# Patient Record
Sex: MALE | Race: WHITE | NOT HISPANIC OR LATINO | URBAN - METROPOLITAN AREA
[De-identification: names, ages, dates, MRNs, and addresses within clinical notes are randomized per-mention and may not be internally consistent; named-entity substitution may affect disease eponyms.]

---

## 2023-10-13 DIAGNOSIS — F43.23 ADJUSTMENT DISORDER WITH MIXED ANXIETY AND DEPRESSED MOOD: Primary | ICD-10-CM

## 2023-10-16 RX ORDER — PAROXETINE HYDROCHLORIDE 40 MG/1
40 TABLET, FILM COATED ORAL DAILY
Qty: 30 TABLET | Refills: 0 | Status: SHIPPED | OUTPATIENT
Start: 2023-10-16 | End: 2023-12-06 | Stop reason: SDUPTHER

## 2023-10-20 PROBLEM — R69 DISEASE: Status: ACTIVE | Noted: 2023-10-20

## 2023-10-20 RX ORDER — TAMSULOSIN HYDROCHLORIDE 0.4 MG/1
0.4 CAPSULE ORAL NIGHTLY
COMMUNITY
Start: 2021-05-05 | End: 2024-05-22 | Stop reason: SDUPTHER

## 2023-10-20 RX ORDER — ZOLPIDEM TARTRATE 10 MG/1
TABLET ORAL
COMMUNITY
Start: 2021-09-24 | End: 2023-10-25 | Stop reason: SDUPTHER

## 2023-10-20 RX ORDER — ASPIRIN 81 MG/1
TABLET ORAL
COMMUNITY
Start: 2021-09-20

## 2023-10-20 RX ORDER — TIZANIDINE 2 MG/1
TABLET ORAL
COMMUNITY
Start: 2022-01-10

## 2023-10-23 ENCOUNTER — APPOINTMENT (OUTPATIENT)
Dept: PRIMARY CARE | Facility: CLINIC | Age: 48
End: 2023-10-23
Payer: MEDICAID

## 2023-10-25 ENCOUNTER — OFFICE VISIT (OUTPATIENT)
Dept: PRIMARY CARE | Facility: CLINIC | Age: 48
End: 2023-10-25
Payer: MEDICAID

## 2023-10-25 VITALS
TEMPERATURE: 97.6 F | RESPIRATION RATE: 16 BRPM | WEIGHT: 162 LBS | OXYGEN SATURATION: 97 % | SYSTOLIC BLOOD PRESSURE: 118 MMHG | HEART RATE: 80 BPM | HEIGHT: 71 IN | DIASTOLIC BLOOD PRESSURE: 72 MMHG | BODY MASS INDEX: 22.68 KG/M2

## 2023-10-25 DIAGNOSIS — G47.00 INSOMNIA, UNSPECIFIED TYPE: Primary | ICD-10-CM

## 2023-10-25 DIAGNOSIS — G89.29 CHRONIC LOW BACK PAIN WITHOUT SCIATICA, UNSPECIFIED BACK PAIN LATERALITY: Primary | ICD-10-CM

## 2023-10-25 DIAGNOSIS — Z00.00 WELLNESS EXAMINATION: ICD-10-CM

## 2023-10-25 DIAGNOSIS — M54.50 CHRONIC LOW BACK PAIN WITHOUT SCIATICA, UNSPECIFIED BACK PAIN LATERALITY: Primary | ICD-10-CM

## 2023-10-25 DIAGNOSIS — N40.0 BENIGN PROSTATIC HYPERPLASIA WITHOUT LOWER URINARY TRACT SYMPTOMS: ICD-10-CM

## 2023-10-25 PROCEDURE — 99214 OFFICE O/P EST MOD 30 MIN: CPT | Performed by: INTERNAL MEDICINE

## 2023-10-25 RX ORDER — ZOLPIDEM TARTRATE 10 MG/1
10 TABLET ORAL NIGHTLY PRN
Qty: 90 TABLET | Refills: 1 | Status: SHIPPED | OUTPATIENT
Start: 2023-10-25 | End: 2024-05-22 | Stop reason: SINTOL

## 2023-10-25 RX ORDER — ZOLPIDEM TARTRATE 10 MG/1
TABLET ORAL
Qty: 90 TABLET | Refills: 1 | Status: SHIPPED | OUTPATIENT
Start: 2023-10-25 | End: 2023-10-25 | Stop reason: SDUPTHER

## 2023-10-25 RX ORDER — ZOLPIDEM TARTRATE 10 MG/1
10 TABLET ORAL NIGHTLY PRN
Qty: 90 TABLET | Refills: 1 | Status: SHIPPED | OUTPATIENT
Start: 2023-10-25 | End: 2023-10-25 | Stop reason: SDUPTHER

## 2023-10-25 ASSESSMENT — ENCOUNTER SYMPTOMS
HEADACHES: 1
FEVER: 0
DIZZINESS: 0
CONSTIPATION: 0
ABDOMINAL PAIN: 0
BACK PAIN: 1
PALPITATIONS: 0
ADENOPATHY: 0
ACTIVITY CHANGE: 0
JOINT SWELLING: 0
NERVOUS/ANXIOUS: 1
FREQUENCY: 1
HIP PAIN: 1
BLOOD IN STOOL: 0
DYSURIA: 0
ARTHRALGIAS: 1
NUMBNESS: 0
COUGH: 0
SHORTNESS OF BREATH: 0

## 2023-10-25 NOTE — PROGRESS NOTES
Patient was seen and examined by me. History and exam as documented above by PA/NP was confirmed by me.  Agree with plan as outlined above. Subjective   Patient ID: Darwin Beck is a 48 y.o. male who presents for No chief complaint on file..     He is doing ok , feels fine. He denies recent migraine headaches, happens rarely now , and takes tylenol , and has seen Dr Ulices Fish and he takes Nurtec ODT prn  for headaches which works like wonder.  He has noticed low back pain over last 6 months which is slowly getting worse, he does not have weakness, tingling or numbness, or limping gait.  No foot drop , B/B incontinence. He denies injury or fall, lifting heavy weight etc.  He had imaging studies , MRI done. His Cervical spine xray showed moderate multilevel DJD. Ambien helps for sleep. 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     Hip Pain   Pertinent negatives include no numbness.        Review of Systems   Constitutional:  Negative for activity change and fever.   HENT:  Negative for congestion.    Eyes:  Negative for visual disturbance.   Respiratory:  Negative for cough and shortness of breath.    Cardiovascular:  Negative for chest pain, palpitations and leg swelling.   Gastrointestinal:  Negative for abdominal pain, blood in stool and constipation.   Genitourinary:  Positive for frequency. Negative for dysuria, enuresis and urgency.   Musculoskeletal:  Positive for arthralgias and back pain. Negative for joint swelling.   Skin:  Negative for rash.   Neurological:  Positive for headaches. Negative for dizziness and numbness.        Migraine headaches, Nurtec ODT helps   Hematological:  Negative for adenopathy.   Psychiatric/Behavioral:  The patient is nervous/anxious.        Objective   There were no vitals taken for this visit.    Physical Exam  Constitutional:       Appearance: Normal appearance.   HENT:      Nose: No congestion.      Mouth/Throat:      Mouth: Mucous membranes are moist.      Pharynx: Oropharynx is clear.   Eyes:      Conjunctiva/sclera: Conjunctivae normal.   Cardiovascular:      Rate and Rhythm: Normal rate and  regular rhythm.      Pulses: Normal pulses.      Heart sounds: Normal heart sounds. No murmur heard.  Pulmonary:      Effort: Pulmonary effort is normal.      Breath sounds: Normal breath sounds.   Abdominal:      General: Abdomen is flat. Bowel sounds are normal.      Palpations: Abdomen is soft.   Musculoskeletal:         General: Tenderness present. Normal range of motion.      Cervical back: Normal range of motion and neck supple.      Right lower leg: No edema.      Left lower leg: No edema.      Comments: Lower lumbar spine area soreness  ROM and gait is normal.   Skin:     General: Skin is warm and dry.   Neurological:      General: No focal deficit present.      Mental Status: He is alert and oriented to person, place, and time.   Psychiatric:         Behavior: Behavior normal.         Assessment/Plan     Cervical spine DJD :   s/p nerve block , no current issues  otc analgesics , heat prn  back stretching exercises      Low back pain / DJD likely:  XR of LS spine  Otc analgesics, heat prn    Migraine headaches:  Nurtec ODT  prn   He tried Qlipta for prevention, but this changed his mood  consider Inderal ?   Neurology followup  , Dr Ulices Fish      Insomnia:  refill Zolpidem   OARRS reviewed      Mixed anxiety and depression:   Paxil seems to help well  Psych followup  , counselling     BPH:  Tamsulosin at hs  may followup with Urology  he had  normal PSA     Vision care  He had flushot, covid booster at pharmacy  Check labs   

## 2023-12-06 DIAGNOSIS — F43.23 ADJUSTMENT DISORDER WITH MIXED ANXIETY AND DEPRESSED MOOD: ICD-10-CM

## 2023-12-06 RX ORDER — PAROXETINE HYDROCHLORIDE 40 MG/1
40 TABLET, FILM COATED ORAL DAILY
Qty: 30 TABLET | Refills: 0 | Status: SHIPPED | OUTPATIENT
Start: 2023-12-06 | End: 2024-02-19

## 2024-04-16 DIAGNOSIS — N40.0 BENIGN PROSTATIC HYPERPLASIA WITHOUT LOWER URINARY TRACT SYMPTOMS: ICD-10-CM

## 2024-04-16 DIAGNOSIS — F43.23 ADJUSTMENT DISORDER WITH MIXED ANXIETY AND DEPRESSED MOOD: ICD-10-CM

## 2024-04-16 DIAGNOSIS — Z00.00 WELLNESS EXAMINATION: Primary | ICD-10-CM

## 2024-04-16 DIAGNOSIS — E78.00 PURE HYPERCHOLESTEROLEMIA: ICD-10-CM

## 2024-04-17 ENCOUNTER — APPOINTMENT (OUTPATIENT)
Dept: PRIMARY CARE | Facility: CLINIC | Age: 49
End: 2024-04-17
Payer: MEDICAID

## 2024-05-22 ENCOUNTER — OFFICE VISIT (OUTPATIENT)
Dept: PRIMARY CARE | Facility: CLINIC | Age: 49
End: 2024-05-22
Payer: MEDICAID

## 2024-05-22 VITALS
HEIGHT: 71 IN | TEMPERATURE: 97.2 F | DIASTOLIC BLOOD PRESSURE: 58 MMHG | SYSTOLIC BLOOD PRESSURE: 124 MMHG | WEIGHT: 165 LBS | BODY MASS INDEX: 23.1 KG/M2 | HEART RATE: 80 BPM | OXYGEN SATURATION: 97 %

## 2024-05-22 DIAGNOSIS — N40.0 BENIGN PROSTATIC HYPERPLASIA WITHOUT LOWER URINARY TRACT SYMPTOMS: Primary | ICD-10-CM

## 2024-05-22 DIAGNOSIS — F43.23 ADJUSTMENT DISORDER WITH MIXED ANXIETY AND DEPRESSED MOOD: ICD-10-CM

## 2024-05-22 PROCEDURE — 99214 OFFICE O/P EST MOD 30 MIN: CPT | Performed by: INTERNAL MEDICINE

## 2024-05-22 PROCEDURE — 99396 PREV VISIT EST AGE 40-64: CPT | Performed by: INTERNAL MEDICINE

## 2024-05-22 PROCEDURE — 1036F TOBACCO NON-USER: CPT | Performed by: INTERNAL MEDICINE

## 2024-05-22 RX ORDER — PAROXETINE HYDROCHLORIDE 40 MG/1
40 TABLET, FILM COATED ORAL DAILY
Qty: 90 TABLET | Refills: 1 | Status: SHIPPED | OUTPATIENT
Start: 2024-05-22

## 2024-05-22 RX ORDER — TAMSULOSIN HYDROCHLORIDE 0.4 MG/1
0.4 CAPSULE ORAL NIGHTLY
Qty: 90 CAPSULE | Refills: 1 | Status: SHIPPED | OUTPATIENT
Start: 2024-05-22

## 2024-05-22 ASSESSMENT — ENCOUNTER SYMPTOMS
NERVOUS/ANXIOUS: 1
FREQUENCY: 1
HIP PAIN: 1
CONSTIPATION: 0
SHORTNESS OF BREATH: 0
BLOOD IN STOOL: 0
ADENOPATHY: 0
PALPITATIONS: 0
DIZZINESS: 0
BACK PAIN: 1
ACTIVITY CHANGE: 0
DYSURIA: 0

## 2024-05-22 NOTE — PROGRESS NOTES
"Subjective   Patient ID: Darwin Beck is a 49 y.o. male who presents for Follow-up (6 months) and wellness exam and  Med Refill.     He is doing ok , feels fine. He denies recent migraine headaches, happens rarely now , and takes tylenol , and has seen Dr Ulices Fish and he takes Nurtec ODT prn  for headaches which works like wonder.  He has noticed low back pain over last 6 months which is slowly getting worse, he does not have weakness, tingling or numbness, or limping gait.  No foot drop , B/B incontinence. He denies injury or fall, lifting heavy weight etc.  He had imaging studies , MRI done. His Cervical spine xray showed moderate multilevel DJD. Ambien helps for sleep. 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          Review of Systems   Constitutional:  Negative for activity change.   Eyes:  Negative for visual disturbance.   Respiratory:  Negative for shortness of breath.    Cardiovascular:  Negative for palpitations and leg swelling.   Gastrointestinal:  Negative for blood in stool and constipation.   Genitourinary:  Positive for frequency. Negative for dysuria, enuresis and urgency.   Musculoskeletal:  Positive for back pain.   Neurological:  Negative for dizziness.        Migraine headaches, Nurtec ODT helps   Hematological:  Negative for adenopathy.   Psychiatric/Behavioral:  The patient is nervous/anxious.        Objective   /58 (BP Location: Left arm, Patient Position: Sitting, BP Cuff Size: Adult)   Pulse 80   Temp 36.2 °C (97.2 °F) (Temporal)   Ht 1.803 m (5' 11\")   Wt 74.8 kg (165 lb)   SpO2 97%   BMI 23.01 kg/m²     Physical Exam  Constitutional:       Appearance: Normal appearance.   HENT:      Nose: No congestion.      Mouth/Throat:      Mouth: Mucous membranes are moist.      Pharynx: Oropharynx is clear.   Eyes:      Conjunctiva/sclera: Conjunctivae normal.   Cardiovascular:      Rate and Rhythm: Normal rate and regular rhythm.      Pulses: Normal pulses.      Heart " sounds: Normal heart sounds. No murmur heard.  Pulmonary:      Effort: Pulmonary effort is normal.      Breath sounds: Normal breath sounds.   Abdominal:      General: Abdomen is flat. Bowel sounds are normal.      Palpations: Abdomen is soft.   Musculoskeletal:         General: Tenderness present. Normal range of motion.      Cervical back: Normal range of motion and neck supple.      Right lower leg: No edema.      Left lower leg: No edema.      Comments: Lower lumbar spine area soreness  ROM and gait is normal.   Skin:     General: Skin is warm and dry.   Neurological:      General: No focal deficit present.      Mental Status: He is alert and oriented to person, place, and time.   Psychiatric:         Behavior: Behavior normal.         Assessment/Plan     Cervical spine DJD :   s/p nerve block , no current issues  otc analgesics , heat prn  back stretching exercises      Low back pain / DJD likely:  XR of LS spine  Otc analgesics, heat prn    Migraine headaches:  Nurtec ODT  prn   He tried Qlipta for prevention, but this changed his mood  consider Inderal ?   Neurology followup  , Dr Ulices Fish      Insomnia:  He did not tolerate Zolpidem , was stopped  Melatonin prn    Mixed anxiety and depression:   Paxil seems to help well  Psych followup  , counselling - he does not want currently    BPH:  Tamsulosin at hs  may followup with Urology  he had  normal PSA     Vision care  He had flushot, covid booster at pharmacy  Check labs  Colonoscopy , done on 3/18/22

## 2024-11-05 DIAGNOSIS — N40.0 BENIGN PROSTATIC HYPERPLASIA WITHOUT LOWER URINARY TRACT SYMPTOMS: ICD-10-CM

## 2024-11-05 DIAGNOSIS — F43.23 ADJUSTMENT DISORDER WITH MIXED ANXIETY AND DEPRESSED MOOD: Primary | ICD-10-CM

## 2024-11-05 DIAGNOSIS — Z00.00 WELLNESS EXAMINATION: ICD-10-CM

## 2024-11-06 ENCOUNTER — APPOINTMENT (OUTPATIENT)
Dept: PRIMARY CARE | Facility: CLINIC | Age: 49
End: 2024-11-06
Payer: MEDICAID

## 2024-11-06 VITALS
RESPIRATION RATE: 18 BRPM | TEMPERATURE: 97.2 F | HEART RATE: 100 BPM | SYSTOLIC BLOOD PRESSURE: 128 MMHG | BODY MASS INDEX: 23.1 KG/M2 | OXYGEN SATURATION: 98 % | DIASTOLIC BLOOD PRESSURE: 62 MMHG | HEIGHT: 71 IN | WEIGHT: 165 LBS

## 2024-11-06 DIAGNOSIS — M50.30 DEGENERATION OF CERVICAL INTERVERTEBRAL DISC: ICD-10-CM

## 2024-11-06 DIAGNOSIS — F43.23 ADJUSTMENT DISORDER WITH MIXED ANXIETY AND DEPRESSED MOOD: ICD-10-CM

## 2024-11-06 DIAGNOSIS — N40.0 BENIGN PROSTATIC HYPERPLASIA WITHOUT LOWER URINARY TRACT SYMPTOMS: ICD-10-CM

## 2024-11-06 DIAGNOSIS — G43.909 MIGRAINE WITHOUT STATUS MIGRAINOSUS, NOT INTRACTABLE, UNSPECIFIED MIGRAINE TYPE: ICD-10-CM

## 2024-11-06 DIAGNOSIS — K62.5 COLITIS WITH RECTAL BLEEDING: Primary | ICD-10-CM

## 2024-11-06 DIAGNOSIS — K52.9 COLITIS WITH RECTAL BLEEDING: Primary | ICD-10-CM

## 2024-11-06 PROCEDURE — 3008F BODY MASS INDEX DOCD: CPT | Performed by: INTERNAL MEDICINE

## 2024-11-06 PROCEDURE — 1036F TOBACCO NON-USER: CPT | Performed by: INTERNAL MEDICINE

## 2024-11-06 PROCEDURE — 99214 OFFICE O/P EST MOD 30 MIN: CPT | Performed by: INTERNAL MEDICINE

## 2024-11-06 ASSESSMENT — ENCOUNTER SYMPTOMS
ADENOPATHY: 0
ACTIVITY CHANGE: 0
FREQUENCY: 1
NERVOUS/ANXIOUS: 1
SHORTNESS OF BREATH: 0
BLOOD IN STOOL: 0
DIZZINESS: 0
BACK PAIN: 1
DYSURIA: 0
PALPITATIONS: 0
CONSTIPATION: 0

## 2024-11-06 NOTE — PROGRESS NOTES
"Subjective   Patient ID: Darwin Beck is a 49 y.o. male who presents for Follow-up (6 months) and wellness exam and  Med Refill.    He was seen in ED on 10/23/24, at [facility] for abdominal pain and rectal bleeding, had CT scan, which showed mid descending colon wall thickening, treated fo colitis with oral ATB  cefdinir, and flagyl for 7 days.    He is doing ok , feels fine. He denies recent migraine headaches, happens rarely now , and takes tylenol , and has seen Dr Ulices Fish and he takes Nurtec ODT prn  for headaches which works like wonder.  He has noticed low back pain over last 6 months which is slowly getting worse, he does not have weakness, tingling or numbness, or limping gait.  No foot drop , B/B incontinence. He denies injury or fall, lifting heavy weight etc.  He had imaging studies , MRI done. His Cervical spine xray showed moderate multilevel DJD. Ambien helps for sleep. 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          Review of Systems   Constitutional:  Negative for activity change.   Eyes:  Negative for visual disturbance.   Respiratory:  Negative for shortness of breath.    Cardiovascular:  Negative for palpitations and leg swelling.   Gastrointestinal:  Negative for blood in stool and constipation.        Recent colitis with bleeding  He has slight loose Bms    Genitourinary:  Positive for frequency. Negative for dysuria, enuresis and urgency.   Musculoskeletal:  Positive for back pain.   Neurological:  Negative for dizziness.        Migraine headaches, Nurtec ODT helps  Aimovig  trial   Hematological:  Negative for adenopathy.   Psychiatric/Behavioral:  The patient is nervous/anxious.        Objective   Pulse 100   Temp 36.2 °C (97.2 °F) (Temporal)   Resp 18   Ht 1.803 m (5' 11\")   Wt 74.8 kg (165 lb)   SpO2 98%   BMI 23.01 kg/m²     Physical Exam  Constitutional:       Appearance: Normal appearance.   HENT:      Nose: No congestion.      Mouth/Throat:      Mouth: Mucous " membranes are moist.      Pharynx: Oropharynx is clear.   Eyes:      Conjunctiva/sclera: Conjunctivae normal.   Cardiovascular:      Rate and Rhythm: Normal rate and regular rhythm.      Pulses: Normal pulses.      Heart sounds: Normal heart sounds. No murmur heard.  Pulmonary:      Effort: Pulmonary effort is normal.      Breath sounds: Normal breath sounds.   Abdominal:      General: Abdomen is flat. Bowel sounds are normal.      Palpations: Abdomen is soft.   Musculoskeletal:         General: Tenderness present. Normal range of motion.      Cervical back: Normal range of motion and neck supple.      Right lower leg: No edema.      Left lower leg: No edema.      Comments: ROM and gait is normal.   Skin:     General: Skin is warm and dry.   Neurological:      General: No focal deficit present.      Mental Status: He is alert and oriented to person, place, and time.   Psychiatric:         Behavior: Behavior normal.         Assessment/Plan     Acute colitis : with LGI bleeding  He had colonoscopy in 2022 per Dr Pedro Sagastume.  Finished course of oral ATBs  Recommend to followup  with GI  Hydration, bland diet , probiotics      Cervical spine DJD :   s/p nerve block , no current issues  XR shows Multilevel degenerative disc, facet and endplate changes. Bilateral foraminal   compromise at C5-C6 and C6-C7, right greater than left.    otc analgesics , heat prn  back stretching exercises      Low back pain / DJD likely:  Otc analgesics, heat prn    Migraine headaches:  Nurtec ODT  prn, Aimovig   He tried Qlipta for prevention, but this changed his mood  Neurology followup  , Dr Ulices Fish      Insomnia:  He did not tolerate Zolpidem , was stopped  Melatonin prn    Mixed anxiety and depression:   Paxil seems to help well  Psych followup  , counselling - he does not want currently    BPH:  Tamsulosin at hs  may followup with Urology  he had  normal PSA     Vision care  He had flushot, covid booster at pharmacy  Check  labs  Colonoscopy , done on 3/18/22

## 2024-11-11 DIAGNOSIS — R19.7 DIARRHEA OF PRESUMED INFECTIOUS ORIGIN: Primary | ICD-10-CM

## 2024-11-11 NOTE — PROGRESS NOTES
PT HAS HAD COMPLICATED GI SXS INCLUDING COLITIS AND BLEED IN PT AND COMPLETED ABX PER DR AGUILERA.  NOW HAS DIARRHEA.    1--STOOL PATHOGEN LABS ORDERED.   2--PT IS TO STAY HYDRATED JUICE / WATER 50/50, CHICKEN BROTH, GATORADE.    3--DUE TO DIFFICULTIES GETTING IN TO GI I ADDED DR HOOVER WITH  GI IN [place] FOR PT TO TRY.   4--FOR WORSE SXS PLEASE GO TO THE ER FOR IMMEDIATE CARE.

## 2024-11-12 ENCOUNTER — OFFICE VISIT (OUTPATIENT)
Dept: PRIMARY CARE | Facility: CLINIC | Age: 49
End: 2024-11-12
Payer: MEDICAID

## 2024-11-12 ENCOUNTER — LAB (OUTPATIENT)
Dept: LAB | Facility: LAB | Age: 49
End: 2024-11-12
Payer: MEDICAID

## 2024-11-12 VITALS
HEART RATE: 67 BPM | BODY MASS INDEX: 22.71 KG/M2 | OXYGEN SATURATION: 97 % | DIASTOLIC BLOOD PRESSURE: 72 MMHG | SYSTOLIC BLOOD PRESSURE: 126 MMHG | HEIGHT: 71 IN | WEIGHT: 162.2 LBS

## 2024-11-12 DIAGNOSIS — K62.5 COLITIS WITH RECTAL BLEEDING: ICD-10-CM

## 2024-11-12 DIAGNOSIS — K62.5 COLITIS WITH RECTAL BLEEDING: Primary | ICD-10-CM

## 2024-11-12 DIAGNOSIS — K52.9 COLITIS WITH RECTAL BLEEDING: Primary | ICD-10-CM

## 2024-11-12 DIAGNOSIS — K52.9 COLITIS WITH RECTAL BLEEDING: ICD-10-CM

## 2024-11-12 PROCEDURE — 87506 IADNA-DNA/RNA PROBE TQ 6-11: CPT | Performed by: FAMILY MEDICINE

## 2024-11-12 PROCEDURE — 99213 OFFICE O/P EST LOW 20 MIN: CPT | Performed by: FAMILY MEDICINE

## 2024-11-12 PROCEDURE — 87493 C DIFF AMPLIFIED PROBE: CPT

## 2024-11-12 PROCEDURE — 87328 CRYPTOSPORIDIUM AG IA: CPT

## 2024-11-12 PROCEDURE — 87329 GIARDIA AG IA: CPT

## 2024-11-12 PROCEDURE — 3008F BODY MASS INDEX DOCD: CPT | Performed by: FAMILY MEDICINE

## 2024-11-12 PROCEDURE — 83993 ASSAY FOR CALPROTECTIN FECAL: CPT

## 2024-11-12 NOTE — PROGRESS NOTES
"Subjective   Patient ID: Darwin Beck is a 49 y.o. male who presents for Follow-up (Needs labs for stool sample ).  HPI  3 weeks ago he had GI bleeding   He was treated with antibiotics   Pain and bleeding resolved   But for 3 weeks he has diarrhea.   Requesting stool studies.       Review of Systems    No past medical history on file.    Past Surgical History:   Procedure Laterality Date    CT HEAD ANGIO W AND WO IV CONTRAST  4/1/2021    CT HEAD ANGIO W AND WO IV CONTRAST      Social History     Socioeconomic History    Marital status: Single   Tobacco Use    Smoking status: Never    Smokeless tobacco: Never   Substance and Sexual Activity    Alcohol use: Not Currently    Drug use: Never      No family history on file.    MEDICATIONS AND ALLERGIES:    ALLERGIES Phenothiazines    MEDICATIONS   Current Outpatient Medications on File Prior to Visit   Medication Sig Dispense Refill    aspirin 81 mg EC tablet  (Patient not taking: Reported on 11/6/2024)      PARoxetine (Paxil) 40 mg tablet TAKE ONE TABLET BY MOUTH EVERY DAY 90 tablet 0    rimegepant (Nurtec ODT) 75 mg tablet,disintegrating       tamsulosin (Flomax) 0.4 mg 24 hr capsule Take 1 capsule (0.4 mg) by mouth once daily at bedtime. 90 capsule 1    tiZANidine (Zanaflex) 2 mg tablet  (Patient not taking: Reported on 11/6/2024)      [DISCONTINUED] PARoxetine (Paxil) 40 mg tablet Take 1 tablet (40 mg) by mouth once daily. 90 tablet 1     No current facility-administered medications on file prior to visit.              Objective   Visit Vitals  /72 (BP Location: Left arm, Patient Position: Sitting, BP Cuff Size: Adult)   Pulse 67   Ht 1.803 m (5' 11\")   Wt 73.6 kg (162 lb 3.2 oz)   SpO2 97%   BMI 22.62 kg/m²   Smoking Status Never   BSA 1.92 m²          1/11/2022     1:47 PM 1/11/2022     2:08 PM 1/13/2022    11:32 AM 10/25/2023     1:36 PM 5/22/2024     1:25 PM 11/6/2024     1:10 PM 11/12/2024     4:12 PM   Vitals   Systolic  118  118 124 128 126   Diastolic  " HISTORY:

cough, sob  



COMPARISON:

Chest x-ray performed 10/8/16 



TECHNIQUE:

Chest PA and lateral



FINDINGS:





LUNGS:

Mild perihilar bronchial wall thickening which can be seen with 

reactive airways disease, viral infection, or bronchiolitis. No focal 

consolidation.



PLEURA:

No significant pleural effusion identified. No definite pneumothorax .



CARDIOVASCULAR:

The cardiothymic silhouette appears unremarkable.



OSSEOUS STRUCTURES:

Skeletally immature patient. No acute osseous abnormality identified.



VISUALIZED UPPER ABDOMEN:

Unremarkable.



OTHER FINDINGS:

None.



IMPRESSION:

Mild perihilar bronchial wall thickening which can be seen with 

reactive airways disease, viral infection, or bronchiolitis. "80  72 58 62 72   Heart Rate 78   80 80 100 67   Temp 36.3 °C (97.3 °F)   36.4 °C (97.6 °F) 36.2 °C (97.2 °F) 36.2 °C (97.2 °F)    Resp    16  18    Height (in) 1.803 m (5' 11\")  1.803 m (5' 11\") 1.803 m (5' 11\") 1.803 m (5' 11\") 1.803 m (5' 11\") 1.803 m (5' 11\")   Weight (lb) 164  148 162 165 165 162.2   BMI 22.87 kg/m2  20.64 kg/m2 22.59 kg/m2 23.01 kg/m2 23.01 kg/m2 22.62 kg/m2   BSA (m2) 1.93 m2  1.83 m2 1.92 m2 1.94 m2 1.94 m2 1.92 m2   Visit Report    Report Report Report Report     Physical Exam    Did not appear in distress.     Assessment & Plan  Colitis with rectal bleeding  Will order labs below   Treat as indicated.   Orders:    C. difficile, PCR    Calprotectin, Fecal; Future    Ova/Para + Giardia/Cryptosporidium Antigen; Future    Stool Pathogen Panel, PCR             "

## 2024-11-13 ENCOUNTER — APPOINTMENT (OUTPATIENT)
Dept: PRIMARY CARE | Facility: CLINIC | Age: 49
End: 2024-11-13
Payer: MEDICAID

## 2024-11-13 LAB — C DIF TOX TCDA+TCDB STL QL NAA+PROBE: DETECTED

## 2024-11-14 ENCOUNTER — PATIENT MESSAGE (OUTPATIENT)
Dept: PRIMARY CARE | Facility: CLINIC | Age: 49
End: 2024-11-14
Payer: MEDICAID

## 2024-11-14 DIAGNOSIS — A04.72 CLOSTRIDIUM DIFFICILE COLITIS: Primary | ICD-10-CM

## 2024-11-14 DIAGNOSIS — A04.72 C. DIFFICILE DIARRHEA: Primary | ICD-10-CM

## 2024-11-14 RX ORDER — VANCOMYCIN HYDROCHLORIDE 125 MG/1
125 CAPSULE ORAL 4 TIMES DAILY
Qty: 40 CAPSULE | Refills: 0 | Status: SHIPPED | OUTPATIENT
Start: 2024-11-14 | End: 2024-11-24

## 2024-11-14 NOTE — RESULT ENCOUNTER NOTE
C. DIFF + PCR:  TX: DIFICID 200 MG BY MOUTH TWICE DAILY X 10 DAYS. SENT.      Treatment for a C. diff (Clostridioides difficile) infection depends on the severity of the infection and whether it's the first episode or a recurrence:   Antibiotics  The standard-of-care antibiotics for C. diff are vancomycin and fidaxomicin.[DIFICID],  Metronidazole is only recommended if vancomycin or fidaxomicin are unavailable. If the infection is related to an antibiotic, the healthcare provider will likely stop that antibiotic.

## 2024-11-16 LAB — SCAN RESULT: NORMAL

## 2024-11-17 LAB
CRYPTOSP AG STL QL IA: NEGATIVE
G LAMBLIA AG STL QL IA: NEGATIVE

## 2024-11-19 LAB — CALPROTECTIN STL-MCNT: >3000 UG/G

## 2024-11-21 ENCOUNTER — TELEPHONE (OUTPATIENT)
Dept: PRIMARY CARE | Facility: CLINIC | Age: 49
End: 2024-11-21
Payer: MEDICAID

## 2024-11-21 NOTE — TELEPHONE ENCOUNTER
----- Message from Wilfredo Hi sent at 11/21/2024  2:02 PM EST -----  Stool calprotectin which is an inflammatory marker in the stool is high, this can related to C. difficile infection, recommend to follow-up with Dr. Scales for recheck and discuss in more details

## 2024-11-22 LAB — O+P STL MICRO: NEGATIVE

## 2024-12-01 DIAGNOSIS — N40.0 BENIGN PROSTATIC HYPERPLASIA WITHOUT LOWER URINARY TRACT SYMPTOMS: ICD-10-CM

## 2024-12-01 DIAGNOSIS — Z00.00 WELLNESS EXAMINATION: Primary | ICD-10-CM

## 2024-12-01 DIAGNOSIS — F43.23 ADJUSTMENT DISORDER WITH MIXED ANXIETY AND DEPRESSED MOOD: ICD-10-CM

## 2024-12-02 ENCOUNTER — APPOINTMENT (OUTPATIENT)
Dept: PRIMARY CARE | Facility: CLINIC | Age: 49
End: 2024-12-02
Payer: MEDICAID

## 2025-01-13 ENCOUNTER — APPOINTMENT (OUTPATIENT)
Dept: PRIMARY CARE | Facility: CLINIC | Age: 50
End: 2025-01-13
Payer: MEDICAID

## 2025-01-22 ENCOUNTER — APPOINTMENT (OUTPATIENT)
Dept: PRIMARY CARE | Facility: CLINIC | Age: 50
End: 2025-01-22
Payer: MEDICAID

## 2025-02-12 ENCOUNTER — APPOINTMENT (OUTPATIENT)
Dept: PRIMARY CARE | Facility: CLINIC | Age: 50
End: 2025-02-12
Payer: MEDICAID

## 2025-04-02 ENCOUNTER — APPOINTMENT (OUTPATIENT)
Dept: PRIMARY CARE | Facility: CLINIC | Age: 50
End: 2025-04-02
Payer: MEDICAID

## 2025-04-07 ENCOUNTER — APPOINTMENT (OUTPATIENT)
Dept: GASTROENTEROLOGY | Facility: CLINIC | Age: 50
End: 2025-04-07
Payer: MEDICAID

## 2025-05-06 ENCOUNTER — APPOINTMENT (OUTPATIENT)
Dept: PRIMARY CARE | Facility: CLINIC | Age: 50
End: 2025-05-06
Payer: MEDICAID

## 2025-05-14 ENCOUNTER — APPOINTMENT (OUTPATIENT)
Dept: PRIMARY CARE | Facility: CLINIC | Age: 50
End: 2025-05-14
Payer: MEDICAID

## 2025-05-29 ENCOUNTER — APPOINTMENT (OUTPATIENT)
Dept: PRIMARY CARE | Facility: CLINIC | Age: 50
End: 2025-05-29
Payer: MEDICAID

## 2025-06-24 ENCOUNTER — APPOINTMENT (OUTPATIENT)
Dept: PRIMARY CARE | Facility: CLINIC | Age: 50
End: 2025-06-24
Payer: MEDICAID

## 2025-07-18 ENCOUNTER — APPOINTMENT (OUTPATIENT)
Dept: PRIMARY CARE | Facility: CLINIC | Age: 50
End: 2025-07-18
Payer: MEDICAID